# Patient Record
Sex: FEMALE | Race: WHITE | URBAN - METROPOLITAN AREA
[De-identification: names, ages, dates, MRNs, and addresses within clinical notes are randomized per-mention and may not be internally consistent; named-entity substitution may affect disease eponyms.]

---

## 2022-11-18 ENCOUNTER — OFFICE (OUTPATIENT)
Dept: URBAN - METROPOLITAN AREA CLINIC 38 | Facility: CLINIC | Age: 72
Setting detail: OPHTHALMOLOGY
End: 2022-11-18
Payer: MEDICARE

## 2022-11-18 DIAGNOSIS — Z96.1: ICD-10-CM

## 2022-11-18 DIAGNOSIS — H43.811: ICD-10-CM

## 2022-11-18 DIAGNOSIS — H26.491: ICD-10-CM

## 2022-11-18 PROBLEM — H43.392 VITREOUS FLOATERS;  , LEFT EYE: Status: ACTIVE | Noted: 2022-11-18

## 2022-11-18 PROCEDURE — 99213 OFFICE O/P EST LOW 20 MIN: CPT | Performed by: OPHTHALMOLOGY

## 2022-11-18 ASSESSMENT — REFRACTION_CURRENTRX
OD_AXIS: 093
OS_VPRISM_DIRECTION: SV
OS_SPHERE: +1.00
OS_SPHERE: +3.25
OD_CYLINDER: -0.75
OS_OVR_VA: 20/
OD_CYLINDER: -0.25
OD_VPRISM_DIRECTION: SV
OD_SPHERE: +3.25
OS_AXIS: 093
OS_OVR_VA: 20/
OS_AXIS: 080
OD_SPHERE: +1.25
OD_OVR_VA: 20/
OS_CYLINDER: -0.25
OS_VPRISM_DIRECTION: SV
OD_VPRISM_DIRECTION: SV
OD_AXIS: 098
OD_OVR_VA: 20/
OS_CYLINDER: -1.25

## 2022-11-18 ASSESSMENT — VISUAL ACUITY
OD_BCVA: 20/25-2
OS_BCVA: 20/20-2

## 2022-11-18 ASSESSMENT — KERATOMETRY
OS_AXISANGLE_DEGREES: 107
OD_K1POWER_DIOPTERS: 42.25
METHOD_AUTO_MANUAL: AUTO
OS_K1POWER_DIOPTERS: 42.50
OD_K2POWER_DIOPTERS: 42.25
OS_K2POWER_DIOPTERS: 43.00
OD_AXISANGLE_DEGREES: 090

## 2022-11-18 ASSESSMENT — TONOMETRY
OD_IOP_MMHG: 18
OS_IOP_MMHG: 19

## 2022-11-18 ASSESSMENT — AXIALLENGTH_DERIVED
OS_AL: 23.7713
OS_AL: 23.8209
OD_AL: 23.9593
OD_AL: 24.0096

## 2022-11-18 ASSESSMENT — LID POSITION - COMMENTS
OD_COMMENTS: S/P BLEPHAROPLASTY
OS_COMMENTS: S/P BLEPHAROPLASTY

## 2022-11-18 ASSESSMENT — REFRACTION_MANIFEST
OS_SPHERE: +0.50
OD_VA2: 20/20(J1+)
OS_VA2: 20/20(J1+)
OD_SPHERE: +0.50
OD_VA1: 20/20
OS_VA1: 20/20-2
OD_AXIS: 105
OU_VA: 20/20
OS_AXIS: 045
OD_CYLINDER: -0.75
OD_ADD: +3.00
OS_ADD: +3.00
OS_CYLINDER: -0.75

## 2022-11-18 ASSESSMENT — REFRACTION_AUTOREFRACTION
OD_AXIS: 105
OS_AXIS: 046
OS_CYLINDER: -0.50
OS_SPHERE: +0.50
OD_SPHERE: +0.50
OD_CYLINDER: -0.50

## 2022-11-18 ASSESSMENT — CONFRONTATIONAL VISUAL FIELD TEST (CVF)
OS_FINDINGS: FULL
OD_FINDINGS: FULL

## 2022-11-18 ASSESSMENT — SPHEQUIV_DERIVED
OD_SPHEQUIV: 0.125
OS_SPHEQUIV: 0.25
OS_SPHEQUIV: 0.125
OD_SPHEQUIV: 0.25

## 2023-02-07 PROBLEM — Z00.00 ENCOUNTER FOR PREVENTIVE HEALTH EXAMINATION: Status: ACTIVE | Noted: 2023-02-07

## 2023-02-10 ENCOUNTER — APPOINTMENT (OUTPATIENT)
Dept: ORTHOPEDIC SURGERY | Facility: CLINIC | Age: 73
End: 2023-02-10
Payer: MEDICARE

## 2023-02-10 VITALS — WEIGHT: 152 LBS | HEIGHT: 64 IN | BODY MASS INDEX: 25.95 KG/M2

## 2023-02-10 DIAGNOSIS — Z87.898 PERSONAL HISTORY OF OTHER SPECIFIED CONDITIONS: ICD-10-CM

## 2023-02-10 DIAGNOSIS — I10 ESSENTIAL (PRIMARY) HYPERTENSION: ICD-10-CM

## 2023-02-10 DIAGNOSIS — E78.00 PURE HYPERCHOLESTEROLEMIA, UNSPECIFIED: ICD-10-CM

## 2023-02-10 PROCEDURE — 99214 OFFICE O/P EST MOD 30 MIN: CPT

## 2023-02-10 RX ORDER — DULOXETINE HYDROCHLORIDE 40 MG/1
CAPSULE, DELAYED RELEASE PELLETS ORAL
Refills: 0 | Status: ACTIVE | COMMUNITY

## 2023-02-10 RX ORDER — METHYLPREDNISOLONE 4 MG/1
4 TABLET ORAL
Qty: 1 | Refills: 0 | Status: ACTIVE | COMMUNITY
Start: 2023-02-10 | End: 1900-01-01

## 2023-02-10 RX ORDER — METHOCARBAMOL 750 MG/1
750 TABLET, FILM COATED ORAL
Qty: 60 | Refills: 0 | Status: ACTIVE | COMMUNITY
Start: 2023-02-10 | End: 1900-01-01

## 2023-02-10 RX ORDER — ANASTROZOLE TABLETS 1 MG/1
1 TABLET ORAL
Refills: 0 | Status: ACTIVE | COMMUNITY

## 2023-02-10 NOTE — ASSESSMENT
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  Patient advised not to take any NSAIDs while taking the steroids. \par \par Patient given prescription for MRI, follow up after study is completed to discuss results. \par \par Recommend: - NSAID - Heating pad - Muscle relaxer - Neck stretching exercise - Soft cervical collar - Cervical traction Patient is given neck rehabilitation exercise book.

## 2023-02-10 NOTE — HISTORY OF PRESENT ILLNESS
[Neck] : neck [Gradual] : gradual [10] : 10 [2] : 2 [Dull/Aching] : dull/aching [Intermittent] : intermittent [Sleep] : sleep [Nothing helps with pain getting better] : Nothing helps with pain getting better [Exercising] : exercising [Retired] : Work status: retired [de-identified] : Patient presents today with neck pain for a few years with NKI. Previously has injections. Experiencing pain radiating into bilateral shoulder blades and up into the head. Going to PT 2-3x a week with some relief. Taking Aleve. Had cervical spine MRI and CT at .  [] : no [FreeTextEntry7] : into the head and into bilateral shoulder blades [de-identified] : cervical spine MRI and CT at

## 2023-02-10 NOTE — DATA REVIEWED
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

## 2023-02-14 ENCOUNTER — RESULT REVIEW (OUTPATIENT)
Age: 73
End: 2023-02-14

## 2023-02-22 ENCOUNTER — NON-APPOINTMENT (OUTPATIENT)
Age: 73
End: 2023-02-22

## 2023-03-01 ENCOUNTER — APPOINTMENT (OUTPATIENT)
Dept: ORTHOPEDIC SURGERY | Facility: CLINIC | Age: 73
End: 2023-03-01
Payer: MEDICARE

## 2023-03-01 VITALS — BODY MASS INDEX: 25.95 KG/M2 | WEIGHT: 152 LBS | HEIGHT: 64 IN

## 2023-03-01 DIAGNOSIS — M48.02 SPINAL STENOSIS, CERVICAL REGION: ICD-10-CM

## 2023-03-01 DIAGNOSIS — M47.812 SPONDYLOSIS W/OUT MYELOPATHY OR RADICULOPATHY, CERVICAL REGION: ICD-10-CM

## 2023-03-01 DIAGNOSIS — Q76.49 OTHER CONGENITAL MALFORMATIONS OF SPINE, NOT ASSOCIATED WITH SCOLIOSIS: ICD-10-CM

## 2023-03-01 DIAGNOSIS — M50.220 OTHER CERVICAL DISC DISPLACEMENT, MID-CERVICAL REGION, UNSPECIFIED LEVEL: ICD-10-CM

## 2023-03-01 DIAGNOSIS — M47.22 OTHER SPONDYLOSIS WITH RADICULOPATHY, CERVICAL REGION: ICD-10-CM

## 2023-03-01 DIAGNOSIS — M43.12 SPONDYLOLISTHESIS, CERVICAL REGION: ICD-10-CM

## 2023-03-01 DIAGNOSIS — M50.320 OTHER CERVICAL DISC DEGENERATION, MID-CERVICAL REGION, UNSPECIFIED LEVEL: ICD-10-CM

## 2023-03-01 DIAGNOSIS — M62.838 OTHER MUSCLE SPASM: ICD-10-CM

## 2023-03-01 PROCEDURE — 99214 OFFICE O/P EST MOD 30 MIN: CPT

## 2023-03-01 NOTE — HISTORY OF PRESENT ILLNESS
[Neck] : neck [Gradual] : gradual [10] : 10 [2] : 2 [Dull/Aching] : dull/aching [Intermittent] : intermittent [Sleep] : sleep [Nothing helps with pain getting better] : Nothing helps with pain getting better [Exercising] : exercising [Retired] : Work status: retired [de-identified] : Follow up cervical spine MRI Results at . States when she stands she gets dizzy. States she hasn't been to PT in 2 weeks.  Admits to taking Aleve for pain. Using cane for ambulation. [] : no [FreeTextEntry7] : into the head and into bilateral shoulder blades [de-identified] : cervical spine MRI and CT at

## 2023-03-01 NOTE — DATA REVIEWED
[MRI] : MRI [Cervical Spine] : cervical spine [Report was reviewed and noted in the chart] : The report was reviewed and noted in the chart [I independently reviewed and interpreted images and report] : I independently reviewed and interpreted images and report [FreeTextEntry1] : Small disc bulge C4-5\par No significant herniation or stenosis noted.

## 2023-03-01 NOTE — ASSESSMENT
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 \par \par Recommend: - NSAID - Heating pad - Muscle relaxer - Neck stretching exercise - Soft cervical collar - Cervical traction Patient is given neck rehabilitation exercise book. \par \par Follow up PRN

## 2023-05-18 ENCOUNTER — OFFICE (OUTPATIENT)
Dept: URBAN - METROPOLITAN AREA CLINIC 38 | Facility: CLINIC | Age: 73
Setting detail: OPHTHALMOLOGY
End: 2023-05-18
Payer: MEDICARE

## 2023-05-18 DIAGNOSIS — H11.153: ICD-10-CM

## 2023-05-18 DIAGNOSIS — Z96.1: ICD-10-CM

## 2023-05-18 DIAGNOSIS — H35.371: ICD-10-CM

## 2023-05-18 DIAGNOSIS — H43.813: ICD-10-CM

## 2023-05-18 DIAGNOSIS — H52.03: ICD-10-CM

## 2023-05-18 DIAGNOSIS — H26.493: ICD-10-CM

## 2023-05-18 PROBLEM — H52.4 PRESBYOPIA: Status: ACTIVE | Noted: 2023-05-18

## 2023-05-18 PROCEDURE — 92014 COMPRE OPH EXAM EST PT 1/>: CPT | Performed by: OPHTHALMOLOGY

## 2023-05-18 PROCEDURE — 92015 DETERMINE REFRACTIVE STATE: CPT | Performed by: OPHTHALMOLOGY

## 2023-05-18 PROCEDURE — 92201 OPSCPY EXTND RTA DRAW UNI/BI: CPT | Performed by: OPHTHALMOLOGY

## 2023-05-18 PROCEDURE — 92134 CPTRZ OPH DX IMG PST SGM RTA: CPT | Performed by: OPHTHALMOLOGY

## 2023-05-18 ASSESSMENT — KERATOMETRY
OD_K1POWER_DIOPTERS: 42.00
OS_K2POWER_DIOPTERS: 43.25
OD_K2POWER_DIOPTERS: 42.25
OS_AXISANGLE_DEGREES: 100
OD_AXISANGLE_DEGREES: 100
METHOD_AUTO_MANUAL: AUTO
OS_K1POWER_DIOPTERS: 42.50

## 2023-05-18 ASSESSMENT — REFRACTION_MANIFEST
OD_ADD: +2.50
OS_CYLINDER: SPH
OS_AXIS: 040
OS_SPHERE: +2.50
OS_SPHERE: +0.50
OS_ADD: +2.50
OU_VA: 20/20
OD_SPHERE: +2.50
OS_VA1: 20/20-2
OD_SPHERE: +0.50
OS_CYLINDER: -0.75
OS_VA2: 20/30(J2)
OD_AXIS: 105
OD_VA2: 20/30(J2)
OD_CYLINDER: -0.25
OD_CYLINDER: SPH
OD_VA1: 20/20

## 2023-05-18 ASSESSMENT — REFRACTION_AUTOREFRACTION
OD_AXIS: 108
OD_SPHERE: +0.50
OD_CYLINDER: -0.50
OS_AXIS: 038
OS_SPHERE: +0.50
OS_CYLINDER: -0.50

## 2023-05-18 ASSESSMENT — REFRACTION_CURRENTRX
OD_ADD: +2.00
OD_OVR_VA: 20/
OS_OVR_VA: 20/
OS_VPRISM_DIRECTION: SV
OS_OVR_VA: 20/
OS_ADD: +2.00
OD_OVR_VA: 20/
OD_VPRISM_DIRECTION: SV

## 2023-05-18 ASSESSMENT — AXIALLENGTH_DERIVED
OD_AL: 24.0067
OS_AL: 23.7248
OD_AL: 23.9564
OS_AL: 23.7742

## 2023-05-18 ASSESSMENT — CONFRONTATIONAL VISUAL FIELD TEST (CVF)
OD_FINDINGS: FULL
OS_FINDINGS: FULL

## 2023-05-18 ASSESSMENT — SPHEQUIV_DERIVED
OS_SPHEQUIV: 0.125
OD_SPHEQUIV: 0.25
OD_SPHEQUIV: 0.375
OS_SPHEQUIV: 0.25

## 2023-05-18 ASSESSMENT — VISUAL ACUITY
OD_BCVA: 20/30-1
OS_BCVA: 20/30-1

## 2023-05-18 ASSESSMENT — LID POSITION - COMMENTS
OD_COMMENTS: S/P BLEPHAROPLASTY
OS_COMMENTS: S/P BLEPHAROPLASTY

## 2023-05-18 ASSESSMENT — TONOMETRY
OS_IOP_MMHG: 19
OD_IOP_MMHG: 17

## 2024-10-19 ENCOUNTER — NON-APPOINTMENT (OUTPATIENT)
Age: 74
End: 2024-10-19

## 2025-09-12 ENCOUNTER — APPOINTMENT (OUTPATIENT)
Dept: ORTHOPEDIC SURGERY | Facility: CLINIC | Age: 75
End: 2025-09-12
Payer: MEDICARE

## 2025-09-12 DIAGNOSIS — Z87.19 PERSONAL HISTORY OF OTHER DISEASES OF THE DIGESTIVE SYSTEM: ICD-10-CM

## 2025-09-12 DIAGNOSIS — Z86.39 PERSONAL HISTORY OF OTHER ENDOCRINE, NUTRITIONAL AND METABOLIC DISEASE: ICD-10-CM

## 2025-09-12 DIAGNOSIS — Z86.79 PERSONAL HISTORY OF OTHER DISEASES OF THE CIRCULATORY SYSTEM: ICD-10-CM

## 2025-09-12 DIAGNOSIS — Z47.89 ENCOUNTER FOR OTHER ORTHOPEDIC AFTERCARE: ICD-10-CM

## 2025-09-12 DIAGNOSIS — C80.1 MALIGNANT (PRIMARY) NEOPLASM, UNSPECIFIED: ICD-10-CM

## 2025-09-12 DIAGNOSIS — Z86.718 PERSONAL HISTORY OF OTHER VENOUS THROMBOSIS AND EMBOLISM: ICD-10-CM

## 2025-09-12 DIAGNOSIS — Z86.69 PERSONAL HISTORY OF OTHER DISEASES OF THE NERVOUS SYSTEM AND SENSE ORGANS: ICD-10-CM

## 2025-09-12 DIAGNOSIS — S72.001D FRACTURE OF UNSPECIFIED PART OF NECK OF RIGHT FEMUR, SUBSEQUENT ENCOUNTER FOR CLOSED FRACTURE WITH ROUTINE HEALING: ICD-10-CM

## 2025-09-12 PROCEDURE — 73502 X-RAY EXAM HIP UNI 2-3 VIEWS: CPT

## 2025-09-12 PROCEDURE — 99024 POSTOP FOLLOW-UP VISIT: CPT
